# Patient Record
Sex: FEMALE | Race: BLACK OR AFRICAN AMERICAN | NOT HISPANIC OR LATINO | Employment: FULL TIME | URBAN - METROPOLITAN AREA
[De-identification: names, ages, dates, MRNs, and addresses within clinical notes are randomized per-mention and may not be internally consistent; named-entity substitution may affect disease eponyms.]

---

## 2019-11-11 ENCOUNTER — OFFICE VISIT (OUTPATIENT)
Dept: OBSTETRICS AND GYNECOLOGY | Facility: CLINIC | Age: 24
End: 2019-11-11
Payer: COMMERCIAL

## 2019-11-11 VITALS
WEIGHT: 119.06 LBS | BODY MASS INDEX: 21.09 KG/M2 | SYSTOLIC BLOOD PRESSURE: 124 MMHG | HEIGHT: 63 IN | DIASTOLIC BLOOD PRESSURE: 77 MMHG

## 2019-11-11 DIAGNOSIS — R10.2 PELVIC PAIN: Primary | ICD-10-CM

## 2019-11-11 DIAGNOSIS — Z30.431 IUD CHECK UP: ICD-10-CM

## 2019-11-11 LAB
B-HCG UR QL: NEGATIVE
BILIRUB SERPL-MCNC: NORMAL MG/DL
BLOOD URINE, POC: NORMAL
CANDIDA RRNA VAG QL PROBE: NEGATIVE
COLOR, POC UA: YELLOW
CTP QC/QA: YES
G VAGINALIS RRNA GENITAL QL PROBE: NEGATIVE
GLUCOSE UR QL STRIP: NORMAL
KETONES UR QL STRIP: NORMAL
LEUKOCYTE ESTERASE URINE, POC: NORMAL
NITRITE, POC UA: NORMAL
PH, POC UA: 5
PROTEIN, POC: NORMAL
SPECIFIC GRAVITY, POC UA: 1
T VAGINALIS RRNA GENITAL QL PROBE: NEGATIVE
UROBILINOGEN, POC UA: NORMAL

## 2019-11-11 PROCEDURE — 99203 OFFICE O/P NEW LOW 30 MIN: CPT | Mod: PBBFAC | Performed by: NURSE PRACTITIONER

## 2019-11-11 PROCEDURE — 99203 OFFICE O/P NEW LOW 30 MIN: CPT | Mod: S$GLB,,, | Performed by: NURSE PRACTITIONER

## 2019-11-11 PROCEDURE — 99999 PR PBB SHADOW E&M-NEW PATIENT-LVL III: CPT | Mod: PBBFAC,,, | Performed by: NURSE PRACTITIONER

## 2019-11-11 PROCEDURE — 87661 TRICHOMONAS VAGINALIS AMPLIF: CPT

## 2019-11-11 PROCEDURE — 99999 PR PBB SHADOW E&M-NEW PATIENT-LVL III: ICD-10-PCS | Mod: PBBFAC,,, | Performed by: NURSE PRACTITIONER

## 2019-11-11 PROCEDURE — 87491 CHLMYD TRACH DNA AMP PROBE: CPT

## 2019-11-11 PROCEDURE — 81002 URINALYSIS NONAUTO W/O SCOPE: CPT | Mod: PBBFAC | Performed by: NURSE PRACTITIONER

## 2019-11-11 PROCEDURE — 99203 PR OFFICE/OUTPT VISIT, NEW, LEVL III, 30-44 MIN: ICD-10-PCS | Mod: S$GLB,,, | Performed by: NURSE PRACTITIONER

## 2019-11-11 PROCEDURE — 81025 URINE PREGNANCY TEST: CPT | Mod: PBBFAC | Performed by: NURSE PRACTITIONER

## 2019-11-12 ENCOUNTER — TELEPHONE (OUTPATIENT)
Dept: OBSTETRICS AND GYNECOLOGY | Facility: CLINIC | Age: 24
End: 2019-11-12

## 2019-11-12 ENCOUNTER — HOSPITAL ENCOUNTER (OUTPATIENT)
Dept: RADIOLOGY | Facility: OTHER | Age: 24
Discharge: HOME | End: 2019-11-12
Attending: NURSE PRACTITIONER
Payer: COMMERCIAL

## 2019-11-12 DIAGNOSIS — Z30.431 IUD CHECK UP: ICD-10-CM

## 2019-11-12 DIAGNOSIS — R10.2 PELVIC PAIN: ICD-10-CM

## 2019-11-12 PROCEDURE — 76830 US PELVIS COMP WITH TRANSVAG NON-OB (XPD): ICD-10-PCS | Mod: 26,,, | Performed by: RADIOLOGY

## 2019-11-12 PROCEDURE — 76830 TRANSVAGINAL US NON-OB: CPT | Mod: TC

## 2019-11-12 PROCEDURE — 76830 TRANSVAGINAL US NON-OB: CPT | Mod: 26,,, | Performed by: RADIOLOGY

## 2019-11-12 PROCEDURE — 76856 US EXAM PELVIC COMPLETE: CPT | Mod: 26,,, | Performed by: RADIOLOGY

## 2019-11-12 PROCEDURE — 76856 US PELVIS COMP WITH TRANSVAG NON-OB (XPD): ICD-10-PCS | Mod: 26,,, | Performed by: RADIOLOGY

## 2019-11-12 NOTE — PROGRESS NOTES
History & Physical  Gynecology      SUBJECTIVE:     Chief Complaint: Vaginal Pain       History of Present Illness: Patient presents to the [facility] for evaluation of pelvic pain that began within the past 4 weeks and has worsened in the past week. Pain is similar to pain with initial placement of Mirena in 2017. Denies vaginal bleeding, abnormal discharge, fever/chills, or nausea/vomiting. Pain is described as stabbing, rated 8/10, and intermittent. Last sexually active 3 months ago with use of condom.         Review of patient's allergies indicates:  No Known Allergies    History reviewed. No pertinent past medical history.  History reviewed. No pertinent surgical history.  OB History        0    Para   0    Term   0       0    AB   0    Living   0       SAB   0    TAB   0    Ectopic   0    Multiple   0    Live Births   0               Family History   Problem Relation Age of Onset    Breast cancer Maternal Grandmother     Colon cancer Neg Hx     Hypertension Neg Hx     Diabetes Neg Hx     Ovarian cancer Neg Hx      Social History     Tobacco Use    Smoking status: Never Smoker    Smokeless tobacco: Never Used   Substance Use Topics    Alcohol use: Yes    Drug use: Never       No current outpatient medications on file.     No current facility-administered medications for this visit.          Review of Systems:  Review of Systems   Constitutional: Negative for activity change, appetite change, chills, fatigue, fever and unexpected weight change.   HENT: Negative for nasal congestion and mouth sores.    Eyes: Negative for discharge and visual disturbance.   Respiratory: Negative for shortness of breath and wheezing.    Cardiovascular: Negative for chest pain, palpitations and leg swelling.   Gastrointestinal: Negative for abdominal pain, constipation, diarrhea, nausea, vomiting and reflux.   Endocrine: Negative for diabetes, hair loss, hot flashes, hyperthyroidism and hypothyroidism.    Genitourinary: Negative for bladder incontinence, decreased libido, dysmenorrhea, dyspareunia, dysuria, flank pain, frequency, genital sores, hematuria, hot flashes, menorrhagia, menstrual problem, pelvic pain, urgency, vaginal bleeding, vaginal discharge, vaginal pain, urinary incontinence, postcoital bleeding, postmenopausal bleeding, vaginal dryness and vaginal odor.   Musculoskeletal: Negative for arthralgias, back pain, joint swelling, leg pain and myalgias.   Integumentary:  Negative for rash, acne, hair changes, mole/lesion, breast mass, nipple discharge, breast skin changes and breast tenderness.   Neurological: Negative for vertigo, seizures, syncope, numbness and headaches.   Hematological: Negative for adenopathy. Does not bruise/bleed easily.   Psychiatric/Behavioral: Negative for depression and sleep disturbance. The patient is not nervous/anxious.    Breast: Negative for asymmetry, breast self exam, lump, mass, mastodynia, nipple discharge, skin changes and tenderness       OBJECTIVE:     Physical Exam:  Physical Exam   Constitutional: She is oriented to person, place, and time. She appears well-developed and well-nourished. No distress.   HENT:   Head: Normocephalic and atraumatic.   Nose: Nose normal.   Mouth/Throat: Oropharynx is clear and moist.   Eyes: Pupils are equal, round, and reactive to light. Conjunctivae and EOM are normal.   Neck: Normal range of motion. Neck supple. No JVD present. No tracheal deviation present. No thyromegaly present.   Cardiovascular: Normal rate, regular rhythm and intact distal pulses. Exam reveals no gallop and no friction rub.   No murmur heard.  Pulmonary/Chest: Effort normal. No stridor. No respiratory distress. She has no wheezes. She has no rales. She exhibits no tenderness.   Abdominal: Soft. She exhibits no distension and no mass. There is no tenderness. There is no rebound and no guarding. No hernia.   Genitourinary: Rectum normal, vagina normal and uterus  normal. There is no rash, tenderness, lesion or injury on the right labia. There is no rash, tenderness, lesion or injury on the left labia. Uterus is not deviated, not enlarged, not fixed and not tender. Cervix exhibits no motion tenderness, no discharge and no friability. Right adnexum displays no mass, no tenderness and no fullness. Left adnexum displays no mass, no tenderness and no fullness. No erythema, tenderness or bleeding in the vagina. No foreign body in the vagina. No signs of injury around the vagina. No vaginal discharge found.   Genitourinary Comments: IUD strings present and extending 1 cm from cervical os.    No tenderness/guarding with bimanual exam.   Musculoskeletal: Normal range of motion. She exhibits no edema or tenderness.   Lymphadenopathy:     She has no cervical adenopathy.   Neurological: She is alert and oriented to person, place, and time. She displays normal reflexes. No sensory deficit. She exhibits normal muscle tone. Coordination normal.   Skin: Skin is warm and dry. Capillary refill takes less than 2 seconds. No rash noted. She is not diaphoretic. No erythema. No pallor.   Psychiatric: She has a normal mood and affect. Her behavior is normal. Judgment and thought content normal.   Nursing note and vitals reviewed.        ASSESSMENT:       ICD-10-CM ICD-9-CM    1. Pelvic pain R10.2 [id number] US Pelvis Comp with Transvag NON-OB (xpd      C. trachomatis/N. gonorrhoeae by AMP DNA [facility]; Cervicovaginal      Vaginosis Screen by DNA Probe      POCT urine pregnancy      POCT URINE DIPSTICK WITHOUT MICROSCOPE   2. IUD check up Z30.431 V25.42 US Pelvis Comp with Transvag NON-OB (xpd          Plan:      UPT negative.  Affirm/GCCT  Urine dip is clear    Pelvic US for evaluation of pelvic pain and to assess IUD placement. Will await result of imaging/testing for further evaluation for pelvic pain.    She reports pain is adequately controlled with use of Ibuprofen. She declines further pain  medication at this time.  Counseling time: 15 minutes      MARVIN DanielsC

## 2019-11-13 ENCOUNTER — TELEPHONE (OUTPATIENT)
Dept: OBSTETRICS AND GYNECOLOGY | Facility: CLINIC | Age: 24
End: 2019-11-13

## 2019-11-13 LAB
C TRACH DNA SPEC QL NAA+PROBE: NOT DETECTED
N GONORRHOEA DNA SPEC QL NAA+PROBE: NOT DETECTED

## 2019-11-13 NOTE — TELEPHONE ENCOUNTER
Called  the patient and notify that her gonorrhea and chlamydia testing was negative.     Thanks arabella

## 2019-11-18 ENCOUNTER — TELEPHONE (OUTPATIENT)
Dept: OBSTETRICS AND GYNECOLOGY | Facility: CLINIC | Age: 24
End: 2019-11-18

## 2019-11-18 NOTE — TELEPHONE ENCOUNTER
Spoke with pt was able to get her scheduled with a  A physician within two weeks from 11/18/2019 pt voiced understanding with no questions.

## 2019-11-29 ENCOUNTER — OFFICE VISIT (OUTPATIENT)
Dept: OBSTETRICS AND GYNECOLOGY | Facility: CLINIC | Age: 24
End: 2019-11-29
Payer: COMMERCIAL

## 2019-11-29 VITALS
DIASTOLIC BLOOD PRESSURE: 78 MMHG | BODY MASS INDEX: 21.64 KG/M2 | SYSTOLIC BLOOD PRESSURE: 102 MMHG | WEIGHT: 122.13 LBS

## 2019-11-29 DIAGNOSIS — R10.2 PELVIC PAIN: Primary | ICD-10-CM

## 2019-11-29 PROCEDURE — 99213 PR OFFICE/OUTPT VISIT, EST, LEVL III, 20-29 MIN: ICD-10-PCS | Mod: S$GLB,,, | Performed by: STUDENT IN AN ORGANIZED HEALTH CARE EDUCATION/TRAINING PROGRAM

## 2019-11-29 PROCEDURE — 99999 PR PBB SHADOW E&M-EST. PATIENT-LVL II: CPT | Mod: PBBFAC,,, | Performed by: STUDENT IN AN ORGANIZED HEALTH CARE EDUCATION/TRAINING PROGRAM

## 2019-11-29 PROCEDURE — 99999 PR PBB SHADOW E&M-EST. PATIENT-LVL II: ICD-10-PCS | Mod: PBBFAC,,, | Performed by: STUDENT IN AN ORGANIZED HEALTH CARE EDUCATION/TRAINING PROGRAM

## 2019-11-29 PROCEDURE — 99213 OFFICE O/P EST LOW 20 MIN: CPT | Mod: S$GLB,,, | Performed by: STUDENT IN AN ORGANIZED HEALTH CARE EDUCATION/TRAINING PROGRAM

## 2019-11-29 RX ORDER — SPIRONOLACTONE 100 MG/1
TABLET, FILM COATED ORAL
Refills: 5 | COMMUNITY
Start: 2019-11-04

## 2019-11-29 NOTE — PROGRESS NOTES
History & Physical  Gynecology      SUBJECTIVE:     Chief Complaint: Pelvic Pain       History of Present Illness:  Pt presents today as follow up of pelvic pain. Was seen on 19 in [facility] for this issue. GC/CT, affirm negative. IUD strings visualized. TVUS was ordered as the patient reported pain was as bad as when Mirena was just inserted . TVUS shows IUD in correct place with just simple 3cm cyst of right adnexa. No other findings  Today, pain has resolved.    TVUS 19  FINDINGS:  Uterus:  Size: 7.0 x 2.8 x 5.0 cm  Masses: None  Endometrium: Normal in this pre menopausal patient, measuring 2 mm.  IUD appears to be in appropriate position within the endometrial canal.  Right ovary:  Size: 4.7 x 2.7 x 3.3 cm  Appearance: 3.5 cm benign appearing cyst.  Vascular flow: Normal.  Left ovary:  Size: 2.5 x 0.8 x 2.3 cm  Appearance: Normal  Vascular Flow: Normal.  Free Fluid:  Trace   Impression     IUD appears to be in appropriate position within the endometrial canal.         Review of patient's allergies indicates:  No Known Allergies    History reviewed. No pertinent past medical history.  History reviewed. No pertinent surgical history.  OB History        0    Para   0    Term   0       0    AB   0    Living   0       SAB   0    TAB   0    Ectopic   0    Multiple   0    Live Births   0               Family History   Problem Relation Age of Onset    Breast cancer Maternal Grandmother     Colon cancer Neg Hx     Hypertension Neg Hx     Diabetes Neg Hx     Ovarian cancer Neg Hx      Social History     Tobacco Use    Smoking status: Never Smoker    Smokeless tobacco: Never Used   Substance Use Topics    Alcohol use: Yes    Drug use: Never       Current Outpatient Medications   Medication Sig    spironolactone (ALDACTONE) 100 MG tablet TK 1 T PO QD FOR ACNE     No current facility-administered medications for this visit.          Review of Systems:  Review of Systems   Constitutional:  Negative for activity change, appetite change, chills and fever.   Gastrointestinal: Negative for abdominal pain, blood in stool, constipation, diarrhea, nausea and vomiting.   Genitourinary: Negative for dyspareunia, dysuria, hematuria, pelvic pain, vaginal bleeding, vaginal discharge, vaginal pain and postcoital bleeding.        OBJECTIVE:     Physical Exam:  Physical Exam   Constitutional: She is oriented to person, place, and time. She appears well-developed and well-nourished. No distress.   HENT:   Head: Normocephalic and atraumatic.   Eyes: Conjunctivae are normal.   Neck: Normal range of motion.   Cardiovascular: Normal rate.   Pulmonary/Chest: Effort normal.   Musculoskeletal: Normal range of motion.   Neurological: She is alert and oriented to person, place, and time.   Skin: Skin is warm and dry. She is not diaphoretic.   Psychiatric: She has a normal mood and affect.   Vitals reviewed.        ASSESSMENT:       ICD-10-CM ICD-9-CM    1. Pelvic pain R10.2 [id number]           Plan:      Pt presents for followup of pelvic pain. All results normal and reviewed with patient  Pt is no longer having any issues  Considering Mirena removal. discussed alternative forms of birth control if she should want it removed early    Counseling time: 15 minutes    Raisa Steen

## 2019-11-29 NOTE — LETTER
November 29, 2019      Aleyda Samson, NATALIA  [address]  [address]  [facility] [address]           [facility] [id number] [place] [address]  [address] [facility] [address]  Phone: [phone]          Patient: Erin Salcido   MR Number: [id number]   YOB: 1995   Date of Visit: 11/29/2019       Dear Aleyda Samson:    Thank you for referring Erin Salcido to me for evaluation. Attached you will find relevant portions of my assessment and plan of care.    If you have questions, please do not hesitate to call me. I look forward to following Erin Salcido along with you.    Sincerely,    Raisa Steen MD    Enclosure  CC:  No Recipients    If you would like to receive this communication electronically, please contact [email or web address] or [phone] to request more information on [employer] access.    For providers and/or their staff who would like to refer a patient to [facility], please contact us through our one-stop-shop provider referral line, [facility], at [phone].    If you feel you have received this communication in error or would no longer like to receive these types of communications, please e-mail [email or web address]

## 2019-12-04 ENCOUNTER — NURSE TRIAGE (OUTPATIENT)
Dept: ADMINISTRATIVE | Facility: CLINIC | Age: 24
End: 2019-12-04

## 2019-12-04 DIAGNOSIS — R10.2 FEMALE PELVIC PAIN: Primary | ICD-10-CM

## 2019-12-04 NOTE — TELEPHONE ENCOUNTER
Pt states she was seen for an ovarian cyst a few weeks ago and today she started having cramping pain, she states it is now a 4/10 but earlier was 10/10, she denies any bleeding, she denies taking any medication for it, advised her to go to ER if the pain remains or worsens but she states she does not want to because they will tell her there is nothing they can do, asked if she would like to schedule another visit with Dr Steen or Dr Samson and she stated she does not because they told her it will just take time to heal, asked her if there is anything I can do for her and she stated there is not, advised her to use ibuprofen if she is not allergic to get the pain and inflammation under control    Reason for Disposition   Menstrual cramps is main concern   Symptoms began at age over 21    Additional Information   Negative: Shock suspected (e.g., cold/pale/clammy skin, too weak to stand, low BP, rapid pulse)   Negative: Passed out (i.e., lost consciousness, collapsed and was not responding)   Negative: Sounds like a life-threatening emergency to the triager   Negative: Sounds like a life-threatening emergency to the triager   Negative: Abdominal cramps unrelated to menstrual period   Negative: [1] SEVERE pain AND [2] pain clearly increases with coughing   Negative: Patient sounds very sick or weak to the triager   Negative: [1] SEVERE vaginal bleeding (i.e., soaking 2 pads or tampons per hour and present 2 or more hours) AND [2] worse than ever before   Negative: [1] MODERATE vaginal bleeding (i.e., soaking 1 pad or tampon per hour and present > 6 hours) AND [2] worse than ever before   Negative: Fever > 100.5 F (38.1 C)   Negative: Could be pregnant (e.g., missed last menstrual period)   Negative: [1] SEVERE pain (e.g., excruciating cramps) AND [2] worse than ever before AND [3] not improved with ibuprofen or naproxen   Negative: [1] Pain present > 3 days AND [2] normally menstrual cramps last 1-3  days   Negative: Pain only on one side   Negative: Unusual vaginal discharge (e.g., odorous, yellow, green, or foamy-white)   Negative: [1] MODERATE pain (e.g., cramps interfere with normal activities) AND [2] not relieved by ibuprofen or naproxen used per Care Advice   Negative: [1] Pain present > 3 days AND [2] occurs with every menstrual period   Negative: Pain during sexual intercourse    Protocols used: PELVIC PAIN - FEMALE-A-AH, ABDOMINAL PAIN - MENSTRUAL CRAMPS-A-AH

## 2019-12-05 NOTE — TELEPHONE ENCOUNTER
Called back patient to check in after call into on-call nurse last night. States she took ibuprofen and went to sleep last night and is fine today. Torsion/ER precautions discussed. Follow up US ordered for 6-8 weeks

## 2019-12-20 ENCOUNTER — PATIENT MESSAGE (OUTPATIENT)
Dept: OBSTETRICS AND GYNECOLOGY | Facility: CLINIC | Age: 24
End: 2019-12-20

## 2020-01-07 ENCOUNTER — HOSPITAL ENCOUNTER (OUTPATIENT)
Dept: RADIOLOGY | Facility: OTHER | Age: 25
Discharge: HOME | End: 2020-01-07
Attending: STUDENT IN AN ORGANIZED HEALTH CARE EDUCATION/TRAINING PROGRAM
Payer: COMMERCIAL

## 2020-01-07 DIAGNOSIS — R10.2 FEMALE PELVIC PAIN: ICD-10-CM

## 2020-01-07 PROCEDURE — 76830 US PELVIS COMP WITH TRANSVAG NON-OB (XPD): ICD-10-PCS | Mod: 26,,, | Performed by: RADIOLOGY

## 2020-01-07 PROCEDURE — 76830 TRANSVAGINAL US NON-OB: CPT | Mod: 26,,, | Performed by: RADIOLOGY

## 2020-01-07 PROCEDURE — 76830 TRANSVAGINAL US NON-OB: CPT | Mod: TC

## 2020-01-07 PROCEDURE — 76856 US PELVIS COMP WITH TRANSVAG NON-OB (XPD): ICD-10-PCS | Mod: 26,,, | Performed by: RADIOLOGY

## 2020-01-07 PROCEDURE — 76856 US EXAM PELVIC COMPLETE: CPT | Mod: 26,,, | Performed by: RADIOLOGY

## 2020-05-20 ENCOUNTER — PROCEDURE VISIT (OUTPATIENT)
Dept: OBSTETRICS AND GYNECOLOGY | Facility: CLINIC | Age: 25
End: 2020-05-20
Payer: COMMERCIAL

## 2020-05-20 VITALS
BODY MASS INDEX: 21.05 KG/M2 | DIASTOLIC BLOOD PRESSURE: 68 MMHG | SYSTOLIC BLOOD PRESSURE: 100 MMHG | HEIGHT: 63 IN | WEIGHT: 118.81 LBS

## 2020-05-20 DIAGNOSIS — Z30.432 ENCOUNTER FOR IUD REMOVAL: Primary | ICD-10-CM

## 2020-05-20 DIAGNOSIS — Z30.09 CONTRACEPTIVE EDUCATION: ICD-10-CM

## 2020-05-20 PROCEDURE — 99213 PR OFFICE/OUTPT VISIT, EST, LEVL III, 20-29 MIN: ICD-10-PCS | Mod: 25,S$GLB,, | Performed by: STUDENT IN AN ORGANIZED HEALTH CARE EDUCATION/TRAINING PROGRAM

## 2020-05-20 PROCEDURE — 58301 REMOVE INTRAUTERINE DEVICE: CPT | Mod: S$GLB,,, | Performed by: STUDENT IN AN ORGANIZED HEALTH CARE EDUCATION/TRAINING PROGRAM

## 2020-05-20 PROCEDURE — 99213 OFFICE O/P EST LOW 20 MIN: CPT | Mod: 25,S$GLB,, | Performed by: STUDENT IN AN ORGANIZED HEALTH CARE EDUCATION/TRAINING PROGRAM

## 2020-05-20 PROCEDURE — 58301 PR REMOVE, INTRAUTERINE DEVICE: ICD-10-PCS | Mod: S$GLB,,, | Performed by: STUDENT IN AN ORGANIZED HEALTH CARE EDUCATION/TRAINING PROGRAM

## 2020-05-20 NOTE — PROGRESS NOTES
Pt presents today for IUD removal. She had mirena placed two years ago. Had amenorrhea and no pain for one year or so, then started having issues about six months ago. She reports irregular cycles, painful cycles. Also issues with acne.   She has been on pills in the past and had difficult time remembering to take it  She has tried the ring and did not like inserting it.  She is interested in non-hormonal options.  She would like to take some time off of nothing for a bit before starting anything new.     NAD  Normal respiratory rate  Normal pulse  Abdomen is soft, nontender  External genitalia and urethra normal  Vagina normal.  IUD strings noted at cervical os      IUD removed intact.  PMH, medications reviewed. Patient was counseled today on contraceptivel options--Pills, Patch, Depo-Provera, IUD, Nuva Ring, Nexplanon and Mirena/Sklya/Paragard and the pros and cons of each and advatantages of condoms to prevent STDs.  The patient is considering paragard. All questions were answered.  Consistent condom use also encouraged due to aldactone teratogenicity     RTC as needed or for annual exam    Raisa Steen M.D.

## 2020-05-20 NOTE — PATIENT INSTRUCTIONS
Consistent condom use recommended if not on other forms of contraception -especially in light of spironolactone use.  Paragard is the name of the copper IUD  Please message back when you have decided which form you would like to try.

## 2020-09-22 ENCOUNTER — OFFICE VISIT (OUTPATIENT)
Dept: OBSTETRICS AND GYNECOLOGY | Facility: CLINIC | Age: 25
End: 2020-09-22
Payer: OTHER GOVERNMENT

## 2020-09-22 ENCOUNTER — LAB VISIT (OUTPATIENT)
Dept: LAB | Facility: OTHER | Age: 25
End: 2020-09-22
Attending: PHYSICIAN ASSISTANT
Payer: OTHER GOVERNMENT

## 2020-09-22 VITALS
BODY MASS INDEX: 21.91 KG/M2 | WEIGHT: 123.69 LBS | SYSTOLIC BLOOD PRESSURE: 108 MMHG | DIASTOLIC BLOOD PRESSURE: 60 MMHG

## 2020-09-22 DIAGNOSIS — Z12.4 ENCOUNTER FOR PAPANICOLAOU SMEAR FOR CERVICAL CANCER SCREENING: ICD-10-CM

## 2020-09-22 DIAGNOSIS — Z11.3 SCREEN FOR STD (SEXUALLY TRANSMITTED DISEASE): ICD-10-CM

## 2020-09-22 DIAGNOSIS — Z11.3 SCREEN FOR STD (SEXUALLY TRANSMITTED DISEASE): Primary | ICD-10-CM

## 2020-09-22 DIAGNOSIS — B35.6 TINEA CRURIS: ICD-10-CM

## 2020-09-22 PROCEDURE — 99214 PR OFFICE/OUTPT VISIT, EST, LEVL IV, 30-39 MIN: ICD-10-PCS | Mod: S$GLB,,, | Performed by: PHYSICIAN ASSISTANT

## 2020-09-22 PROCEDURE — 86696 HERPES SIMPLEX TYPE 2 TEST: CPT

## 2020-09-22 PROCEDURE — 87510 GARDNER VAG DNA DIR PROBE: CPT

## 2020-09-22 PROCEDURE — 99999 PR PBB SHADOW E&M-EST. PATIENT-LVL III: ICD-10-PCS | Mod: PBBFAC,,, | Performed by: PHYSICIAN ASSISTANT

## 2020-09-22 PROCEDURE — 87491 CHLMYD TRACH DNA AMP PROBE: CPT

## 2020-09-22 PROCEDURE — 86703 HIV-1/HIV-2 1 RESULT ANTBDY: CPT

## 2020-09-22 PROCEDURE — 87480 CANDIDA DNA DIR PROBE: CPT

## 2020-09-22 PROCEDURE — 86803 HEPATITIS C AB TEST: CPT

## 2020-09-22 PROCEDURE — 88175 CYTOPATH C/V AUTO FLUID REDO: CPT

## 2020-09-22 PROCEDURE — 87340 HEPATITIS B SURFACE AG IA: CPT

## 2020-09-22 PROCEDURE — 36415 COLL VENOUS BLD VENIPUNCTURE: CPT

## 2020-09-22 PROCEDURE — 87529 HSV DNA AMP PROBE: CPT

## 2020-09-22 PROCEDURE — 86592 SYPHILIS TEST NON-TREP QUAL: CPT

## 2020-09-22 PROCEDURE — 99214 OFFICE O/P EST MOD 30 MIN: CPT | Mod: S$GLB,,, | Performed by: PHYSICIAN ASSISTANT

## 2020-09-22 PROCEDURE — 99999 PR PBB SHADOW E&M-EST. PATIENT-LVL III: CPT | Mod: PBBFAC,,, | Performed by: PHYSICIAN ASSISTANT

## 2020-09-22 PROCEDURE — 86694 HERPES SIMPLEX NES ANTBDY: CPT

## 2020-09-22 RX ORDER — CLOTRIMAZOLE 1 %
CREAM (GRAM) TOPICAL
Qty: 30 G | Refills: 1 | Status: SHIPPED | OUTPATIENT
Start: 2020-09-22 | End: 2021-01-27

## 2020-09-22 NOTE — PATIENT INSTRUCTIONS
Jock Itch (Tinea Cruris, General)  Jock itch (tinea cruris) is a red, itchy rash in the groin caused by a fungal infection. It occurs in skin folds where it is warm and moist. It commonly starts as a small, red, itchy patch that grows larger. The patch is usually in the shape of a round ring, 1 to 2 inches wide. It may cause the skin to flake. It may also spread to the scrotum or the skin that covers your testicles. This infection is treated with skin creams or oral medicine.  Home care  · If you were prescribed a cream, use it exactly as directed. You can buy some antifungal creams without a prescription.  · It may take a week before the fungus starts to go away. It can take about 2 to 3 weeks to completely clear. To stop the rash from coming back, keep using the medicine until the rash is all gone.  · Wash the area at least once a day with soap and water. Pat dry and apply medicine.   · Wear loose-fitting underwear to let your skin breathe. Change your underwear daily.  · Once the rash is gone, keep the area clean and dry to prevent reinfection. If recurrence is a problem, use a medicated antifungal powder daily. This is available over the counter.  Prevention  The following tips may help prevent jock itch:  · Don't share clothes, towels, or sports gear with others unless they have been washed.  · Change your underwear daily.  · Keep skin clean and dry, especially after showering or swimming.  · Lose weight.  · Do not wear tight underwear.  · Treat athlete's foot if it occurs.  Follow-up care  Follow up with your healthcare provider, or as advised. Call your provider if the rash is not starting to improve after 10 days of treatment, or if the rash continues to spread.  When to seek medical advice  Call your healthcare provider right away if any of these occur:  · Increasing pain in the rash area  · Redness that spreads around the rash  · Fluid draining from the rash  · Rash returns soon after treatment  · Fever  of 100.4°F (38°C) or higher, or as directed by your provider  Date Last Reviewed: 8/1/2016 © 2000-2017 [employer]. [address] [place] [address], [place] [address]. All rights reserved. This information is not intended as a substitute for professional medical care. Always follow your healthcare professional's instructions.        Hemorrhoids    Hemorrhoids are swollen and inflamed veins inside the rectum and near the anus. The rectum is the last several inches of the colon. The anus is the passage between the rectum and the outside of the body.  Causes  The veins can become swollen due to increased pressure in them. This is most often caused by:  · Chronic constipation or diarrhea  · Straining when having a bowel movement  · Sitting too long on the toilet  · A low-fiber diet  · Pregnancy  Symptoms  · Bleeding from the rectum (this may be noticeable after bowel movements)  · Lump near the anus  · Itching around the anus  · Pain around the anus  There are different types of hemorrhoids. Depending on the type you have and the severity, you may be able to treat yourself at home. In some cases, a procedure may be the best treatment option. Your healthcare provider can tell you more about this, if needed.  Home care  General care  · To get relief from pain or itching, try:  ¨ Topical products. Your healthcare provider may prescribe or recommend creams, ointments, or pads that can be applied to the hemorrhoid. Use these exactly as directed.  ¨ Medicines. Your healthcare provider may recommend stool softeners, suppositories, or laxatives to help manage constipation. Use these exactly as directed.  ¨ Sitz baths. A sitz bath involves sitting in a few inches of warm bath water. Be careful not to make the water so hot that you burn yourself--test it before sitting in it. Soak for about 10 to 15 minutes a few times a day. This may help relieve pain.  Tips to help prevent hemorrhoids  · Eat more fiber. Fiber adds bulk to  stool and absorbs water as it moves through your colon. This makes stool softer and easier to pass.  ¨ Increase the fiber in your diet with more fiber-rich foods. These include fresh fruit, vegetables, and whole grains.  ¨ Take a fiber supplement or bulking agent, if advised to by your provider. These include products such as psyllium or methylcellulose.  · Drink plenty of water, if directed to by your provider. This can help keep stool soft.  · Be more active. Frequent exercise aids digestion and helps prevent constipation. It may also help make bowel movements more regular.  · Dont strain during bowel movements. This can make hemorrhoids more likely. Also, dont sit on the toilet for long periods of time.  Follow-up care  Follow up with your healthcare provider, or as advised. If a culture or imaging tests were done, you will be notified of the results when they are ready. This may take a few days or longer.  When to seek medical advice  Call your healthcare provider right away if any of these occur:  · Increased bleeding from the rectum  · Increased pain around the rectum or anus  · Weakness or dizziness  Call 911  Call 911 or return to the emergency department right away if any of these occur:  · Trouble breathing or swallowing  · Fainting or loss of consciousness  · Unusually fast heart rate  · Vomiting blood  · Large amounts of blood in stool  Date Last Reviewed: 6/22/2015  © 3765-8448 [employer] [address] [place] [address], [place] [address]. All rights reserved. This information is not intended as a substitute for professional medical care. Always follow your healthcare professional's instructions.

## 2020-09-22 NOTE — PROGRESS NOTES
CC: Screening for sexually transmitted infection    Erin Salcido is a 24 y.o. female  presents for STD screening  No LMP recorded. Patient has had an implant..  Reports rash for 1 month; denies itching or pain. States it is on her buttocks and noticed one on her arm recently. Denies pelvic or abdominal pain.  Denies vulvovaginal itching or irritation.  Denies abnormal or foul vaginal discharge. States her partner has a history of HSV1 10 years ago, no current relapse.     Last pap: none on file, submitted today.       ROS:  GENERAL: Denies weight gain or weight loss. Feeling well overall.   SKIN: Denies rash or lesions.   HEAD: Denies head injury or headache.   NODES: Denies enlarged lymph nodes.   CHEST: Denies chest pain or shortness of breath.   CARDIOVASCULAR: Denies palpitations or left sided chest pain.   ABDOMEN: No abdominal pain, constipation, diarrhea, nausea, vomiting or rectal bleeding.   URINARY: No frequency, dysuria, hematuria, or burning on urination.  REPRODUCTIVE: See HPI.   BREASTS: The patient performs breast self-examination and denies pain, lumps, or nipple discharge.     PHYSICAL EXAM:    APPEARANCE: Well nourished, well developed, in no acute distress.  AFFECT: Alert and oriented x 3  SKIN: Warm, dry, & intact. No acne or hirsutism.  NECK: Neck symmetric  NODES: No inguinal or femoral lymph node enlargement  CHEST:  Easy, even breaths.  ABDOMEN: Soft.  Nontender, nondistended.  PELVIC: Normal external genitalia without lesions.  Normal hair distribution.  Adequate perineal body, normal urethral meatus.    Vagina moist and well rugated without lesions or discharge.  Cervix pink, without lesions, discharge or tenderness.  No significant cystocele or rectocele.    Bimanual exam shows uterus to be normal size, regular, mobile and nontender.  Adnexa without masses or tenderness.    EXTREMITIES: No edema.    Screen for STD (sexually transmitted disease)  -     Hepatitis C Antibody; Future;  Expected date: 09/22/2020  -     C. trachomatis/N. gonorrhoeae by AMP DNA  -     HIV-1 and HIV-2 antibodies; Future; Expected date: 09/22/2020  -     RPR; Future; Expected date: 09/22/2020  -     Hepatitis B surface antigen; Future; Expected date: 09/22/2020  -     Vaginosis Screen by DNA Probe  -     Herpes simplex type 1 & 2 IgM,Herpes IgM; Future; Expected date: 09/22/2020  -     Herpes simplex type 1&2 IgG,Herpes titer; Future; Expected date: 09/22/2020  -     Herpes simplex virus culture    Encounter for Papanicolaou smear for cervical cancer screening  -     Liquid-Based Pap Smear, Screening    Tinea cruris  -     clotrimazole (LOTRIMIN) 1 % cream; Apply to affected area 2 times daily  Dispense: 30 g; Refill: 1        Patient was counseled today on prevention of STDs with use of condoms.  We also reviewed A.C.S. Pap guidelines and recommendations for yearly pelvic exams, mammograms and monthly self breast exams; to see her PCP for other health maintenance.     Followup pending lab results

## 2020-09-23 LAB
HBV SURFACE AG SERPL QL IA: NEGATIVE
HCV AB SERPL QL IA: NEGATIVE
HIV 1+2 AB+HIV1 P24 AG SERPL QL IA: NEGATIVE
RPR SER QL: NORMAL

## 2020-09-24 LAB
CANDIDA RRNA VAG QL PROBE: NEGATIVE
G VAGINALIS RRNA GENITAL QL PROBE: NEGATIVE
T VAGINALIS RRNA GENITAL QL PROBE: NEGATIVE

## 2020-09-25 LAB
HSV1 DNA SPEC QL NAA+PROBE: NEGATIVE
HSV2 DNA SPEC QL NAA+PROBE: NEGATIVE
SPECIMEN SOURCE: NORMAL

## 2020-09-26 LAB
HSV1 IGG SERPL QL IA: NEGATIVE
HSV2 IGG SERPL QL IA: NEGATIVE

## 2020-09-28 LAB — HSV AB, IGM BY EIA: 1.2 INDEX

## 2020-09-29 ENCOUNTER — TELEPHONE (OUTPATIENT)
Dept: OBSTETRICS AND GYNECOLOGY | Facility: CLINIC | Age: 25
End: 2020-09-29

## 2020-09-29 DIAGNOSIS — Z11.3 SCREEN FOR STD (SEXUALLY TRANSMITTED DISEASE): Primary | ICD-10-CM

## 2020-10-12 LAB
FINAL PATHOLOGIC DIAGNOSIS: NORMAL
Lab: NORMAL

## 2020-10-22 ENCOUNTER — OFFICE VISIT (OUTPATIENT)
Dept: OBSTETRICS AND GYNECOLOGY | Facility: CLINIC | Age: 25
End: 2020-10-22
Payer: COMMERCIAL

## 2020-10-22 VITALS
WEIGHT: 121.94 LBS | BODY MASS INDEX: 21.61 KG/M2 | SYSTOLIC BLOOD PRESSURE: 113 MMHG | HEIGHT: 63 IN | DIASTOLIC BLOOD PRESSURE: 67 MMHG

## 2020-10-22 DIAGNOSIS — B35.4 RINGWORM OF BODY: ICD-10-CM

## 2020-10-22 DIAGNOSIS — Z30.011 ENCOUNTER FOR INITIAL PRESCRIPTION OF CONTRACEPTIVE PILLS: Primary | ICD-10-CM

## 2020-10-22 LAB
B-HCG UR QL: NEGATIVE
CTP QC/QA: YES

## 2020-10-22 PROCEDURE — 99213 OFFICE O/P EST LOW 20 MIN: CPT | Mod: 25,S$GLB,, | Performed by: OBSTETRICS & GYNECOLOGY

## 2020-10-22 PROCEDURE — 81025 PR  URINE PREGNANCY TEST: ICD-10-PCS | Mod: S$GLB,,, | Performed by: OBSTETRICS & GYNECOLOGY

## 2020-10-22 PROCEDURE — 99999 PR PBB SHADOW E&M-EST. PATIENT-LVL III: CPT | Mod: PBBFAC,,, | Performed by: OBSTETRICS & GYNECOLOGY

## 2020-10-22 PROCEDURE — 81025 URINE PREGNANCY TEST: CPT | Mod: S$GLB,,, | Performed by: OBSTETRICS & GYNECOLOGY

## 2020-10-22 PROCEDURE — 99213 PR OFFICE/OUTPT VISIT, EST, LEVL III, 20-29 MIN: ICD-10-PCS | Mod: 25,S$GLB,, | Performed by: OBSTETRICS & GYNECOLOGY

## 2020-10-22 PROCEDURE — 99999 PR PBB SHADOW E&M-EST. PATIENT-LVL III: ICD-10-PCS | Mod: PBBFAC,,, | Performed by: OBSTETRICS & GYNECOLOGY

## 2020-10-22 RX ORDER — LULICONAZOLE 10 MG/G
1 CREAM TOPICAL DAILY
Qty: 60 G | Refills: 0 | Status: SHIPPED | OUTPATIENT
Start: 2020-10-22 | End: 2020-12-03

## 2020-10-22 RX ORDER — NORETHINDRONE ACETATE AND ETHINYL ESTRADIOL .02; 1 MG/1; MG/1
1 TABLET ORAL DAILY
Qty: 28 TABLET | Refills: 12 | Status: SHIPPED | OUTPATIENT
Start: 2020-10-22 | End: 2021-04-06 | Stop reason: SINTOL

## 2020-10-22 NOTE — PATIENT INSTRUCTIONS
Birth Control: The Pill    Birth control pills contain hormones that help prevent pregnancy. The pills are prescribed by your healthcare provider. There are many types of birth control pills available. If you have side effects from one type of pill, tell your healthcare provider. He or she may be able to prescribe a pill that works better for you.  Pregnancy rates  Talk to your healthcare provider about the effectiveness of this birth control method.  Using the pill  · Take one pill daily. Take it at around the same time each day.  · Follow your healthcare providers guidelines on when to start your first pack of pills. You may need to use another form of birth control for a week or more after you start.  · Know what to do if you forget to take a pill. (Consult your healthcare provider or check the package.) If you miss more than one pill, you may need to use a backup method of birth control for a week or more.  Pros  · Low pregnancy rate  · No interruption to sex  · Easy to use  · Can help make periods more regular  · May lower your risk of ovarian cysts and certain cancers  · May decrease menstrual cramps, menstrual flow, and acne  Cons  · Does not protect against sexually transmitted infection (STIs)  · Requires taking a pill on time each day  · May not work as well when taken with certain other medicines (check with your pharmacist)  · May cause side effects such as nausea, irregular bleeding, headaches, breast tenderness, fatigue, or mood changes (these often go away within 3 months)  · May increase the risk of blood clots, heart attack, and stroke  The pill may not be for you  The pill may not be for you if:  · You are a smoker and over age 35  · You have high blood pressure or gallbladder, liver, cerebrovascular  or heart disease  · You have diabetes, migraines, blood clot in the vein or artery, lupus, depression, certain lipid disorders, or take medicines that interfere with the pill  In these cases,  discuss the risks with your healthcare provider.  Date Last Reviewed: 3/1/2017  © 2535-5377 [employer]. [address] [place] [address], [place] [address]. All rights reserved. This information is not intended as a substitute for professional medical care. Always follow your healthcare professional's instructions.

## 2020-10-22 NOTE — PROGRESS NOTES
History & Physical  Gynecology      SUBJECTIVE:     Chief Complaint: Contraception       History of Present Illness:  Contraception Counseling  Ms. Salcido is a 25 y/o female who presents for contraception counseling. The patient has no complaints today. She reports that she had a mirena but it cause facial acne and other issues. Patient reports that she was on OCPs as a teenager which initially caused nausea and abdominal pain. She reports that after switching her OCPs she did fine. The patient is sexually active. Pertinent past medical history: none.        Review of patient's allergies indicates:  No Known Allergies    History reviewed. No pertinent past medical history.  History reviewed. No pertinent surgical history.  OB History        0    Para   0    Term   0       0    AB   0    Living   0       SAB   0    TAB   0    Ectopic   0    Multiple   0    Live Births   0               Family History   Problem Relation Age of Onset    Breast cancer Maternal Grandmother     Colon cancer Neg Hx     Hypertension Neg Hx     Diabetes Neg Hx     Ovarian cancer Neg Hx      Social History     Tobacco Use    Smoking status: Never Smoker    Smokeless tobacco: Never Used   Substance Use Topics    Alcohol use: Yes    Drug use: Never       Current Outpatient Medications   Medication Sig    clotrimazole (LOTRIMIN) 1 % cream Apply to affected area 2 times daily    luliconazole 1 % Crea Apply 1 g topically once daily.    norethindrone-ethinyl estradiol (LOESTRIN 1/20, 21,) 1-20 mg-mcg per tablet Take 1 tablet by mouth once daily.    spironolactone (ALDACTONE) 100 MG tablet TK 1 T PO QD FOR ACNE     No current facility-administered medications for this visit.          Review of Systems:  Review of Systems   Constitutional: Negative for chills and fever.   Respiratory: Negative for cough and wheezing.    Cardiovascular: Negative for chest pain and palpitations.   Gastrointestinal: Negative for abdominal pain,  nausea and vomiting.   Genitourinary: Negative for dysuria, frequency, hematuria, pelvic pain, vaginal bleeding, vaginal discharge and vaginal pain.   Psychiatric/Behavioral: Negative for depression.        OBJECTIVE:     Physical Exam:  Physical Exam  Vitals signs and nursing note reviewed.   Constitutional:       Appearance: She is well-developed.   Cardiovascular:      Rate and Rhythm: Normal rate.   Pulmonary:      Effort: Pulmonary effort is normal. No respiratory distress.   Chest:      Breasts: Breasts are symmetrical.     Abdominal:      General: There is no distension.      Palpations: Abdomen is soft.      Tenderness: There is no abdominal tenderness.   Skin:     General: Skin is warm and dry.   Neurological:      Mental Status: She is alert and oriented to person, place, and time.         ASSESSMENT:       ICD-10-CM ICD-9-CM    1. Encounter for initial prescription of contraceptive pills  Z30.011 V25.01 norethindrone-ethinyl estradiol (LOESTRIN 1/20, 21,) 1-20 mg-mcg per tablet      POCT urine pregnancy   2. Ringworm of body  B35.4 110.5 luliconazole 1 % Crea        Plan:      Erin was seen today for contraception.    Diagnoses and all orders for this visit:    Encounter for initial prescription of contraceptive pills  -     norethindrone-ethinyl estradiol (LOESTRIN 1/20, 21,) 1-20 mg-mcg per tablet; Take 1 tablet by mouth once daily.  -     POCT urine pregnancy    Ringworm of body  -     luliconazole 1 % Crea; Apply 1 g topically once daily.        Orders Placed This Encounter   Procedures    POCT urine pregnancy       Follow up in about 1 year (around 10/22/2021) for Well Woman/Annual.    Counseling time: 15 minutes    Juan C Valderrama

## 2020-11-10 ENCOUNTER — PATIENT MESSAGE (OUTPATIENT)
Dept: OBSTETRICS AND GYNECOLOGY | Facility: CLINIC | Age: 25
End: 2020-11-10

## 2020-11-25 ENCOUNTER — OFFICE VISIT (OUTPATIENT)
Dept: FAMILY MEDICINE | Facility: CLINIC | Age: 25
End: 2020-11-25
Payer: OTHER GOVERNMENT

## 2020-11-25 DIAGNOSIS — F32.A ANXIETY AND DEPRESSION: Primary | ICD-10-CM

## 2020-11-25 DIAGNOSIS — L60.0 INGROWN TOENAIL: ICD-10-CM

## 2020-11-25 DIAGNOSIS — Z01.83 BLOOD TYPING ENCOUNTER: ICD-10-CM

## 2020-11-25 DIAGNOSIS — R41.840 CONCENTRATION DEFICIT: ICD-10-CM

## 2020-11-25 DIAGNOSIS — Z13.220 SCREENING FOR HYPERLIPIDEMIA: ICD-10-CM

## 2020-11-25 DIAGNOSIS — F41.9 ANXIETY AND DEPRESSION: Primary | ICD-10-CM

## 2020-11-25 DIAGNOSIS — Z20.822 EXPOSURE TO COVID-19 VIRUS: ICD-10-CM

## 2020-11-25 PROCEDURE — 99203 OFFICE O/P NEW LOW 30 MIN: CPT | Mod: 95,,, | Performed by: FAMILY MEDICINE

## 2020-11-25 PROCEDURE — 99203 PR OFFICE/OUTPT VISIT, NEW, LEVL III, 30-44 MIN: ICD-10-PCS | Mod: 95,,, | Performed by: FAMILY MEDICINE

## 2020-11-25 NOTE — PROGRESS NOTES
Chief Complaint   Patient presents with    Establish Care       HPI  Erin Salcido is a 25 y.o. female with multiple medical diagnoses as listed in the medical history and problem list that presents for establishment of care . 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 Has had these sx before. Has family members with similar sx but no formal diagnosis. She has also had worsening issues with concentration that have been present throughout childhood but are progressing now and she would like to be evaluated for ADHD    PAST MEDICAL HISTORY:  Past Medical History:   Diagnosis Date    Depression        PAST SURGICAL HISTORY:  History reviewed. No pertinent surgical history.    SOCIAL HISTORY:  Social History     Socioeconomic History    Marital status: Single     Spouse name: Not on file    Number of children: Not on file    Years of education: Not on file    Highest education level: Not on file   Occupational History    Not on file   Social Needs    Financial resource strain: Not on file    Food insecurity     Worry: Not on file     Inability: Not on file    Transportation needs     Medical: Not on file     Non-medical: Not on file   Tobacco Use    Smoking status: Never Smoker    Smokeless tobacco: Never Used   Substance and Sexual Activity    Alcohol use: Yes    Drug use: Never    Sexual activity: Yes     Partners: Male     Birth control/protection: I.U.D., Condom   Lifestyle    Physical activity     Days per week: Not on file     Minutes per session: Not on file    Stress: Not on file   Relationships    Social connections     Talks on phone: Not on file     Gets together: Not on file     Attends Islam service: Not on file     Active member of club or organization: Not on file     Attends meetings of clubs or organizations: Not on file     Relationship status: Not on file    Other Topics Concern    Not on file   Social History Narrative    Not on file       FAMILY HISTORY:  Family History   Problem Relation Age of Onset    Breast cancer Maternal Grandmother     Colon cancer Neg Hx     Hypertension Neg Hx     Diabetes Neg Hx     Ovarian cancer Neg Hx        ALLERGIES AND MEDICATIONS: updated and reviewed.  Review of patient's allergies indicates:  No Known Allergies  Medication List with Changes/Refills   Current Medications    CLOTRIMAZOLE (LOTRIMIN) 1 % CREAM    Apply to affected area 2 times daily    LULICONAZOLE 1 % CREA    Apply 1 g topically once daily.    NORETHINDRONE-ETHINYL ESTRADIOL (LOESTRIN 1/20, 21,) 1-20 MG-MCG PER TABLET    Take 1 tablet by mouth once daily.    SPIRONOLACTONE (ALDACTONE) 100 MG TABLET    TK 1 T PO QD FOR ACNE       ROS  Review of Systems   Constitutional: Negative for activity change and unexpected weight change.   HENT: Negative for hearing loss, rhinorrhea and trouble swallowing.    Eyes: Negative for discharge and visual disturbance.   Respiratory: Negative for chest tightness and wheezing.    Cardiovascular: Negative for chest pain and palpitations.   Gastrointestinal: Negative for blood in stool, constipation, diarrhea and vomiting.   Endocrine: Negative for polydipsia and polyuria.   Genitourinary: Negative for difficulty urinating, dysuria, hematuria and menstrual problem.   Musculoskeletal: Negative for arthralgias, joint swelling and neck pain.   Neurological: Negative for weakness and headaches.   Psychiatric/Behavioral: Positive for dysphoric mood. Negative for confusion.       Physical Exam  There were no vitals filed for this visit. There is no height or weight on file to calculate BMI.            Physical Exam  Vitals signs and nursing note reviewed.   Constitutional:       Appearance: She is well-developed.   Skin:     General: Skin is warm and dry.      Findings: No erythema or rash.   Neurological:      Mental Status: She is  alert and oriented to person, place, and time.   Psychiatric:         Behavior: Behavior normal.         Health Maintenance       Date Due Completion Date    Lipid Panel 1996 ---    HPV Vaccines (1 - 2-dose series) 10/24/2007 ---    TETANUS VACCINE 10/24/2014 ---    Influenza Vaccine (1) 08/01/2020 ---    Chlamydia Screening 11/11/2020 11/11/2019    Pap Smear 09/22/2023 9/22/2020          Health maintenance reviewed and addressed as ordered      ASSESSMENT     1. Anxiety and depression    2. Ingrown toenail    3. Concentration deficit    4. Blood typing encounter    5. Screening for hyperlipidemia    6. Exposure to COVID-19 virus        PLAN:     Problem List Items Addressed This Visit     None      Visit Diagnoses     Anxiety and depression    -  Primary  -she has a hx of concentration problems in childhood that are worsening with recent depression  Will refer to psych for formal eval; currently undergoing therapy    Relevant Orders    Ambulatory referral/consult to Psychiatry    Comprehensive Metabolic Panel    CBC Auto Differential    Ingrown toenail      -refer for removal     Relevant Orders    Ambulatory referral/consult to Podiatry    Concentration deficit      -refer to psych for formal ADHD eval    Blood typing encounter        Relevant Orders    GROUP & RH    Screening for hyperlipidemia        Relevant Orders    Lipid Panel    Exposure to COVID-19 virus        Relevant Orders    COVID-19 (SARS CoV-2) IgG Antibody            Denise Perry MD  11/25/2020 2:06 PM        Follow up if symptoms worsen or fail to improve.    Orders Placed This Encounter   Procedures    Lipid Panel    Comprehensive Metabolic Panel    CBC Auto Differential    COVID-19 (SARS CoV-2) IgG Antibody    Ambulatory referral/consult to Psychiatry    Ambulatory referral/consult to Podiatry    GROUP & RH              The patient location is:  Patient Home   The chief complaint leading to consultation is: depression and ingrown  toenail  Visit type: Virtual visit with synchronous audio and video  Total time spent with patient: 15 min  Each patient to whom he or she provides medical services by telemedicine is:  (1) informed of the relationship between the physician and patient and the respective role of any other health care provider with respect to management of the patient; and (2) notified that he or she may decline to receive medical services by telemedicine and may withdraw from such care at any time.

## 2020-11-25 NOTE — Clinical Note
Please schedule labs at [facility] along with Herpes Titer ordered by Kay Rios for next week    Denise Perry MD

## 2020-11-30 NOTE — PROGRESS NOTES
"Outpatient Psychiatry Initial Visit (MD/NP)    12/3/2020    Erin Salcido, a 25 y.o. female, presenting for initial evaluation visit. Met with patient.    Reason for Encounter: Referral from PAUL Perry MD. Patient complains of   Chief Complaint   Patient presents with    Distractibilty    Inattention   .    History of Present Illness:   Pt complains of symptoms relating to inattention. Sxs began in childhood.    Trouble paying close attention to details, or careless mistakes: admits to  Difficulty sustaining attention/remaining focused: admits to  Absent minded/wandeing thoughts during conversation: admits to  Doesn't follow through on instructions, starts tasks but does not finish or easily distracted: admits to  Difficulty with organizing: admits to  Avoids/dislikes tasks that require sustained attention: admits to  Looses important things: admits to  Easily distracted by extraneous stimuli: admits to  Forgetful in daily activities: admits to   ------  Often fidgets/squirms: admits to  Often leaves seat at inappropriate times: admits to  Runs around, climbing on things or feeling restless: denies  Unable to engage in leisure activities: denies  Often "on the go" , motor driven: admits to  Often talks excessively: denies  Blurts out, interrupts: admits to  Can't wait turn: admits to  Often interrupts/intrudes: admits to    Pt denies elevated, expansive, or irritable mood with increased energy or activity; with inflated self-esteem or grandiosity, decreased need for sleep, increased rate of speech, racing thoughts, increased goal directed activity or risky/disinhibited behavior.     Pt denies symptoms of anxiety including nervousness, uncontrolled worry, being easily fatigued, irritability, muscle tension, and sleep disturbance.     Pt denies depressed mood, anhedonia, decreased motivation, feelings of worthlessness and low self-esteem, sleep disturbance, appetite/wt change,and hopelessness.     Denies any " "SI/HI/AVH.     States she "loves to sleep. It's my favorite thing". Sleeps approximately 7-8 hr/night. Reports no problems with sleep initiation. Reports no problems with sleep maintenance.     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 She  Recently saw a video of adults with ADHD and reports she has all of the same sxs. She reports "my brain is always active". Has been biting her nails since she was a child. Reports bx problems and poor grades prior to the age of 3rd grade when she moved in with her grandparents. States her mother was a young mother and was unable to raise her. States she made good grades through college but had to put in a lot of effort to do so.    Pt reports that she "absolutely will not take any antidepressants".      Previous ADHD dx: denies  Started meds: n/a  Current problems at work: none - recently put in her 2 week notice due to increased work load  How often are meds taken: n/a  Last took stimulant: n/a  ASE of meds: n/a  Current BP: 116/60   Current pulse: 82    Collateral info: pt gives verbal permission for me to speak to  Valerie Salcido (pt's mother)   [phone] - attempted to call - someone picked up and hung up  Rebecca Berger (pt's GM)   [phone] - attempted to call - no answer    Fmly hx of sudden cardiac death: denies    Personal hx of --  Heart problems/arrhythmias/enlarged heart: denies  Heart palpitations: denies  Syncope/dizziness: denies  Hyperthyroidism: denies  Glaucoma: denies  BARRAGAN or SOB: denies  Chest pain: denies      Risk Parameters:  Patient reports no suicidal ideation  Patient reports no homicidal ideation  Patient reports no self-injurious behavior  Patient reports no violent behavior      Psychotropic medication review  Previous Trials-  None    Current meds-  None    Stressors:    Occupational concerns      History:     Past Psychiatric History:   Previous therapy: " "yes  Previous psychiatric hospitalizations: no  Previous diagnoses: no  Previous suicide attempts: no  Currently in treatment with an individual therapist.  History of violence: no    Other pertinent history: None  Access to a gun: denies    Family Psychiatric History:    Reports "every one in my family has something. My mom and my GM are depressed". No other known hx anxiety, depression, bipolar/brittney, psychosis/schizophrenia spectrum disorder, suicide attempts      Additional historical information includes:   Seizure: denies  Head trauma/TBI: denies    The following portions of the patient's history were reviewed and updated as appropriate: allergies, current medications, past family history, past medical history, past social history, past surgical history, and problem list.      Review Of Systems:       Review of Systems   Constitutional: Negative for chills and fever.   HENT: Negative for ear pain and hearing loss.    Eyes: Negative for double vision and pain.   Respiratory: Negative for shortness of breath and wheezing.    Cardiovascular: Negative for chest pain and palpitations.   Gastrointestinal: Negative for abdominal pain, constipation, diarrhea, nausea and vomiting.   Genitourinary: Negative for dysuria and hematuria.   Musculoskeletal: Negative for back pain and neck pain.   Skin: Negative for itching and rash.   Neurological: Negative for dizziness, tremors, seizures and headaches.   Endo/Heme/Allergies: Does not bruise/bleed easily.   Psychiatric/Behavioral:        See HPI          Current Evaluation:     Musculoskeletal  Muscle Strength/Tone:  no dyskinesia, no dystonia, no tremor, no tic   Gait & Station:  non-ataxic      Relevant Elements of Neurological Exam: normal gait      Mental Status Evaluation:  Appearance:  unremarkable, age appropriate, normal weight, casually dressed, neatly groomed   Behavior:  normal, cooperative, eye contact normal   Speech:  no latency; no press, spontaneous   Mood:  " euthymic   Affect:  congruent and appropriate   Thought Process:  normal and logical   Thought Content:  normal, no suicidality, no homicidality, delusions, or paranoia   Sensorium:  grossly intact   Cognition:  grossly intact and fund of knowledge intact and appropriate to age and level of education   Insight:  intact   Judgment:  behavior is adequate to circumstances, age appropriate     Standardized Screenings tools:   Adult ADHD Self-Report Scale: Part A:  20   Part B:  35    Functioning in Relationships:  Spouse/Partner/Family: supportive   Peers: supportive  Employers: stable    Constitutional  Vitals:  Most recent vital signs, dated less than 90 days prior to this appointment, were reviewed.    Vitals:    12/03/20 1656   BP: 116/60   Pulse: 82        General:  unremarkable, age appropriate, normal weight, casually dressed, neatly groomed     Labs: reviewed most recent labs    Laboratory Data  No visits with results within 1 Month(s) from this visit.   Latest known visit with results is:   Office Visit on 10/22/2020   Component Date Value Ref Range Status    POC Preg Test, Ur 10/22/2020 Negative  Negative Final     Acceptable 10/22/2020 Yes   Final         Medications  Outpatient Encounter Medications as of 12/3/2020   Medication Sig Dispense Refill    clotrimazole (LOTRIMIN) 1 % cream Apply to affected area 2 times daily 30 g 1    luliconazole 1 % Crea Apply 1 g topically once daily. 60 g 0    norethindrone-ethinyl estradiol (LOESTRIN 1/20, 21,) 1-20 mg-mcg per tablet Take 1 tablet by mouth once daily. 28 tablet 12    spironolactone (ALDACTONE) 100 MG tablet TK 1 T PO QD FOR ACNE  5     No facility-administered encounter medications on file as of 12/3/2020.            Assessment - Diagnosis - Goals:     Impression:       ICD-10-CM ICD-9-CM   1. Inattention  R41.840 799.51       Strengths and Liabilities: Strength: Patient accepts guidance/feedback, Strength: Patient is  expressive/articulate., Strength: Patient is intelligent., Strength: Patient is physically healthy., Strength: Patient has reasonable judgment., Strength: Patient is stable., Liability: Patient lacks coping skills.    Treatment Goals:    ADHD: Increase frequency of on-task behaviors, sustain attention and concentration consistently for longer period so time, learn techniques for managing stress, and increase the frequency of physical exercise.     Treatment Plan/Recommendations:   · Labs: order UDS   · No meds rx'd today.   · Unsuccessfully ttempted to obtain collateral info from 2 sources.  · Continue with individual therapy  · The treatment plan and follow up plan were reviewed with the patient.  · Discussed with patient informed consent, risks vs. benefits, alternative treatments, side effect profile and the inherent unpredictability of individual responses to these treatments. The patient expresses understanding of the above and displays the capacity to agree with this current plan and had no other questions.   · Encouraged the patient to keep future appointments.   · Take medications as prescribed and abstain from substance use.   · Present to ED or call 911 for SI/HI plan or intent, psychosis, or medical emergency.      Return to Clinic: 1 month    Total time: 60 minutes of total time spent on the encounter, which includes face to face time and non-face to face time preparing to see the patient (eg, review of tests), Obtaining and/or reviewing separately obtained history, Documenting clinical information in the electronic or other health record, Independently interpreting results (not separately reported) and communicating results to the patient/family/caregiver, or Care coordination (not separately reported).         RAMYA Perry, PMHNP-BC

## 2020-12-03 ENCOUNTER — OFFICE VISIT (OUTPATIENT)
Dept: PSYCHIATRY | Facility: CLINIC | Age: 25
End: 2020-12-03
Payer: OTHER GOVERNMENT

## 2020-12-03 VITALS — HEART RATE: 82 BPM | SYSTOLIC BLOOD PRESSURE: 116 MMHG | DIASTOLIC BLOOD PRESSURE: 60 MMHG

## 2020-12-03 DIAGNOSIS — R41.840 INATTENTION: ICD-10-CM

## 2020-12-03 PROCEDURE — 90792 PSYCH DIAG EVAL W/MED SRVCS: CPT | Mod: S$GLB,,, | Performed by: NURSE PRACTITIONER

## 2020-12-03 PROCEDURE — 99999 PR PBB SHADOW E&M-EST. PATIENT-LVL III: ICD-10-PCS | Mod: PBBFAC,,, | Performed by: NURSE PRACTITIONER

## 2020-12-03 PROCEDURE — 90792 PR PSYCHIATRIC DIAGNOSTIC EVALUATION W/MEDICAL SERVICES: ICD-10-PCS | Mod: S$GLB,,, | Performed by: NURSE PRACTITIONER

## 2020-12-03 PROCEDURE — 99999 PR PBB SHADOW E&M-EST. PATIENT-LVL III: CPT | Mod: PBBFAC,,, | Performed by: NURSE PRACTITIONER

## 2020-12-04 ENCOUNTER — LAB VISIT (OUTPATIENT)
Dept: LAB | Facility: OTHER | Age: 25
End: 2020-12-04
Attending: PHYSICIAN ASSISTANT
Payer: OTHER GOVERNMENT

## 2020-12-04 DIAGNOSIS — Z20.822 EXPOSURE TO COVID-19 VIRUS: ICD-10-CM

## 2020-12-04 DIAGNOSIS — Z01.83 BLOOD TYPING ENCOUNTER: ICD-10-CM

## 2020-12-04 DIAGNOSIS — Z11.3 SCREEN FOR STD (SEXUALLY TRANSMITTED DISEASE): ICD-10-CM

## 2020-12-04 DIAGNOSIS — F32.A ANXIETY AND DEPRESSION: ICD-10-CM

## 2020-12-04 DIAGNOSIS — Z13.39 ADHD (ATTENTION DEFICIT HYPERACTIVITY DISORDER) EVALUATION: Primary | ICD-10-CM

## 2020-12-04 DIAGNOSIS — F41.9 ANXIETY AND DEPRESSION: ICD-10-CM

## 2020-12-04 DIAGNOSIS — Z13.220 SCREENING FOR HYPERLIPIDEMIA: ICD-10-CM

## 2020-12-04 LAB
ABO + RH BLD: NORMAL
ALBUMIN SERPL BCP-MCNC: 4.4 G/DL (ref 3.5–5.2)
ALP SERPL-CCNC: 46 U/L (ref 55–135)
ALT SERPL W/O P-5'-P-CCNC: 15 U/L (ref 10–44)
ANION GAP SERPL CALC-SCNC: 8 MMOL/L (ref 8–16)
AST SERPL-CCNC: 16 U/L (ref 10–40)
BASOPHILS # BLD AUTO: 0.03 K/UL (ref 0–0.2)
BASOPHILS NFR BLD: 0.4 % (ref 0–1.9)
BILIRUB SERPL-MCNC: 0.7 MG/DL (ref 0.1–1)
BUN SERPL-MCNC: 10 MG/DL (ref 6–20)
CALCIUM SERPL-MCNC: 9.2 MG/DL (ref 8.7–10.5)
CHLORIDE SERPL-SCNC: 107 MMOL/L (ref 95–110)
CO2 SERPL-SCNC: 22 MMOL/L (ref 23–29)
CREAT SERPL-MCNC: 0.8 MG/DL (ref 0.5–1.4)
DIFFERENTIAL METHOD: NORMAL
EOSINOPHIL # BLD AUTO: 0 K/UL (ref 0–0.5)
EOSINOPHIL NFR BLD: 0.4 % (ref 0–8)
ERYTHROCYTE [DISTWIDTH] IN BLOOD BY AUTOMATED COUNT: 11.9 % (ref 11.5–14.5)
EST. GFR  (AFRICAN AMERICAN): >60 ML/MIN/1.73 M^2
EST. GFR  (NON AFRICAN AMERICAN): >60 ML/MIN/1.73 M^2
GLUCOSE SERPL-MCNC: 73 MG/DL (ref 70–110)
HCT VFR BLD AUTO: 42.1 % (ref 37–48.5)
HGB BLD-MCNC: 14.1 G/DL (ref 12–16)
IMM GRANULOCYTES # BLD AUTO: 0.02 K/UL (ref 0–0.04)
IMM GRANULOCYTES NFR BLD AUTO: 0.2 % (ref 0–0.5)
LYMPHOCYTES # BLD AUTO: 2.3 K/UL (ref 1–4.8)
LYMPHOCYTES NFR BLD: 27.5 % (ref 18–48)
MCH RBC QN AUTO: 29.2 PG (ref 27–31)
MCHC RBC AUTO-ENTMCNC: 33.5 G/DL (ref 32–36)
MCV RBC AUTO: 87 FL (ref 82–98)
MONOCYTES # BLD AUTO: 0.7 K/UL (ref 0.3–1)
MONOCYTES NFR BLD: 8.7 % (ref 4–15)
NEUTROPHILS # BLD AUTO: 5.3 K/UL (ref 1.8–7.7)
NEUTROPHILS NFR BLD: 62.8 % (ref 38–73)
NRBC BLD-RTO: 0 /100 WBC
PLATELET # BLD AUTO: 324 K/UL (ref 150–350)
PMV BLD AUTO: 9.3 FL (ref 9.2–12.9)
POTASSIUM SERPL-SCNC: 3.8 MMOL/L (ref 3.5–5.1)
PROT SERPL-MCNC: 7.1 G/DL (ref 6–8.4)
RBC # BLD AUTO: 4.83 M/UL (ref 4–5.4)
SODIUM SERPL-SCNC: 137 MMOL/L (ref 136–145)
WBC # BLD AUTO: 8.37 K/UL (ref 3.9–12.7)

## 2020-12-04 PROCEDURE — 86901 BLOOD TYPING SEROLOGIC RH(D): CPT

## 2020-12-04 PROCEDURE — 86696 HERPES SIMPLEX TYPE 2 TEST: CPT

## 2020-12-04 PROCEDURE — 86769 SARS-COV-2 COVID-19 ANTIBODY: CPT

## 2020-12-04 PROCEDURE — 36415 COLL VENOUS BLD VENIPUNCTURE: CPT

## 2020-12-04 PROCEDURE — 80061 LIPID PANEL: CPT

## 2020-12-04 PROCEDURE — 85025 COMPLETE CBC W/AUTO DIFF WBC: CPT

## 2020-12-04 PROCEDURE — 80053 COMPREHEN METABOLIC PANEL: CPT

## 2020-12-05 LAB
CHOLEST SERPL-MCNC: 120 MG/DL (ref 120–199)
CHOLEST/HDLC SERPL: 2.9 {RATIO} (ref 2–5)
HDLC SERPL-MCNC: 41 MG/DL (ref 40–75)
HDLC SERPL: 34.2 % (ref 20–50)
HSV1 IGG SERPL QL IA: NEGATIVE
HSV2 IGG SERPL QL IA: NEGATIVE
LDLC SERPL CALC-MCNC: 68 MG/DL (ref 63–159)
NONHDLC SERPL-MCNC: 79 MG/DL
SARS-COV-2 IGG SERPLBLD QL IA.RAPID: NEGATIVE
TRIGL SERPL-MCNC: 55 MG/DL (ref 30–150)

## 2020-12-06 NOTE — PROGRESS NOTES
Your COVID antibody test is negative. Continue to social distance and follow CDC guidelines. Your cholesterol is normal.  Normal sodium, potassium, liver and kidney function. Normal blood count, no anemia, white blood cells, and platelets normal.    Denise Perry MD

## 2020-12-07 ENCOUNTER — CLINICAL SUPPORT (OUTPATIENT)
Dept: PSYCHIATRY | Facility: CLINIC | Age: 25
End: 2020-12-07
Payer: OTHER GOVERNMENT

## 2020-12-07 VITALS
BODY MASS INDEX: 21.77 KG/M2 | HEART RATE: 88 BPM | DIASTOLIC BLOOD PRESSURE: 53 MMHG | WEIGHT: 122.94 LBS | SYSTOLIC BLOOD PRESSURE: 109 MMHG

## 2020-12-07 DIAGNOSIS — Z13.39 ADHD (ATTENTION DEFICIT HYPERACTIVITY DISORDER) EVALUATION: ICD-10-CM

## 2020-12-07 LAB
AMPHET+METHAMPHET UR QL: NEGATIVE
BARBITURATES UR QL SCN>200 NG/ML: NEGATIVE
BENZODIAZ UR QL SCN>200 NG/ML: NEGATIVE
BZE UR QL SCN: NEGATIVE
CANNABINOIDS UR QL SCN: NEGATIVE
CREAT UR-MCNC: 276 MG/DL (ref 15–325)
ETHANOL UR-MCNC: 21 MG/DL
METHADONE UR QL SCN>300 NG/ML: NEGATIVE
OPIATES UR QL SCN: NEGATIVE
PCP UR QL SCN>25 NG/ML: NEGATIVE
TOXICOLOGY INFORMATION: ABNORMAL

## 2020-12-07 PROCEDURE — 99999 PR PBB SHADOW E&M-EST. PATIENT-LVL I: ICD-10-PCS | Mod: PBBFAC,,,

## 2020-12-07 PROCEDURE — 80307 DRUG TEST PRSMV CHEM ANLYZR: CPT

## 2020-12-07 PROCEDURE — 99999 PR PBB SHADOW E&M-EST. PATIENT-LVL I: CPT | Mod: PBBFAC,,,

## 2020-12-08 ENCOUNTER — PATIENT MESSAGE (OUTPATIENT)
Dept: FAMILY MEDICINE | Facility: CLINIC | Age: 25
End: 2020-12-08

## 2020-12-08 DIAGNOSIS — L30.9 DERMATITIS: Primary | ICD-10-CM

## 2020-12-15 ENCOUNTER — OFFICE VISIT (OUTPATIENT)
Dept: PODIATRY | Facility: CLINIC | Age: 25
End: 2020-12-15
Payer: OTHER GOVERNMENT

## 2020-12-15 VITALS — BODY MASS INDEX: 21.76 KG/M2 | WEIGHT: 122.81 LBS | HEIGHT: 63 IN

## 2020-12-15 DIAGNOSIS — M20.41 HAMMER TOES OF BOTH FEET: ICD-10-CM

## 2020-12-15 DIAGNOSIS — L60.0 INGROWN TOENAIL: ICD-10-CM

## 2020-12-15 DIAGNOSIS — M20.5X1 HALLUX LIMITUS, ACQUIRED, RIGHT: ICD-10-CM

## 2020-12-15 DIAGNOSIS — M21.41 PES PLANUS OF BOTH FEET: ICD-10-CM

## 2020-12-15 DIAGNOSIS — L60.8 PINCER NAIL DEFORMITY: ICD-10-CM

## 2020-12-15 DIAGNOSIS — M20.42 HAMMER TOES OF BOTH FEET: ICD-10-CM

## 2020-12-15 DIAGNOSIS — M20.5X2 HALLUX LIMITUS, ACQUIRED, LEFT: ICD-10-CM

## 2020-12-15 DIAGNOSIS — M79.676 GREAT TOE PAIN, UNSPECIFIED LATERALITY: Primary | ICD-10-CM

## 2020-12-15 DIAGNOSIS — M21.42 PES PLANUS OF BOTH FEET: ICD-10-CM

## 2020-12-15 PROCEDURE — 99999 PR PBB SHADOW E&M-EST. PATIENT-LVL III: CPT | Mod: PBBFAC,,, | Performed by: PODIATRIST

## 2020-12-15 PROCEDURE — 99999 PR PBB SHADOW E&M-EST. PATIENT-LVL III: ICD-10-PCS | Mod: PBBFAC,,, | Performed by: PODIATRIST

## 2020-12-15 PROCEDURE — 99203 PR OFFICE/OUTPT VISIT, NEW, LEVL III, 30-44 MIN: ICD-10-PCS | Mod: S$GLB,,, | Performed by: PODIATRIST

## 2020-12-15 PROCEDURE — 99203 OFFICE O/P NEW LOW 30 MIN: CPT | Mod: S$GLB,,, | Performed by: PODIATRIST

## 2020-12-15 NOTE — LETTER
December 15, 2020      Denise Perry MD  [address]  [place] [address]           [facility]  [address] [facility]  [place] [address]  Phone: [phone]          Patient: Erin Salcido   MR Number: [id number]   YOB: 1995   Date of Visit: 12/15/2020       Dear Dr. Denise Perry:    Thank you for referring Erin Salcido to me for evaluation. Attached you will find relevant portions of my assessment and plan of care.    If you have questions, please do not hesitate to call me. I look forward to following Erin Salcido along with you.    Sincerely,    Loan Stinson DPM    Enclosure  CC:  No Recipients    If you would like to receive this communication electronically, please contact [email or web address] or [phone] to request more information on [employer] Link access.    For providers and/or their staff who would like to refer a patient to [facility], please contact us through our one-stop-shop provider referral line, [facility], at [phone].    If you feel you have received this communication in error or would no longer like to receive these types of communications, please e-mail [email or web address]

## 2020-12-15 NOTE — PATIENT INSTRUCTIONS
Supplements for inflammation: Arnica Tabs, bromelain with tumeric, alpha lipoic acid, garlic     Over the counter pain creams: Biofreeze, Bengay, tiger balm, two old goat, lidocaine gel,  Absorbine Veterinary Liniment Gel Topical Analgesic Sore Muscle and Joint Pain Relief    Recommend lotions: eucerin, eucerin for diabetics, aquaphor, A&D ointment, gold bond for diabetics, sween, German's Bees all purpose baby ointment,  urea 40 with aloe (found on amazon.com)    Shoe recommendations: (try 6pm.com, zappos.Advaliant , [email or web address].Advaliant, or shoes.Advaliant for discounted prices) you can visit [employer] shoes in [place]  or [employer] in the [facility] (there are also several shoe brand outlets in the [facility])    Asics (GT 2000 or gel foundations), new balance stability type shoes (such as the 940 series), saucony (stabil c3),  Galarza (GTS or Beast or transcend), propet (tennis shoe)    Sofft Brand, baretraps (women) Donald&Eze (men), clarks, crocs, aerosoles, naturalizers, SAS, ecco, born, florencia calixto, driss (dress shoes)    Vionic, burkenstocks, fitflops, propet, baretraps (sandals)    Nike comfort thong sandals, crocs, propet (house shoes)    Nail Home remedy:  Vicks Vapor rub or Emuaid to nails for easier manageability    Occasional soaks for 15-20 mins in luke warm water with 1/2 cup of listerine and 1 cup of apple cider vinegar are ok You may add several drops of oil of oregano or tea tree oil as well        Ingrown Toenail, Excised  An ingrown toenail occurs when the nail grows sideways into the skin alongside the nail. This can cause pain, especially when wearing tight shoes. It can also lead to an infection with redness and swelling.  The side of the nail will need to be removed in order to stop the pain and release any infection present. If there is a lot of redness and swelling, then an antibiotic may also be used. The redness and pain should begin to go away within 48 hours. It will take about two weeks for the  "exposed nail bed to become dry and all of the swelling to go down.  If only the side of the nail was removed it will begin to grow back in a few months. To prevent recurrence, that side of nail bed may be treated with a strong chemical to prevent the nail from regrowing ("ablation").  Home care  The following guidelines will help you care for your toe at home:  · Once a day beginning in 24 hours::   ¨ Clean the toe separate from your body with warm running water and antibacterial soap such as dial  ¨ Clean any remaining crust away with soap and water using a cotton-tipped applicator.  ¨ Apply betadine to the toe.  ¨ Cover with a breathable bandage or until the exposed nail bed is dry and there is no more drainage.  · Change the dressing daily, or whenever it becomes wet or dirty.  · If you were prescribed antibiotics, take them as directed until they are all gone.  · Wear comfortable shoes with a lot of toe room, or open-toe sandals, while your toe is healing.  · You may use acetaminophen or ibuprofen to control pain, unless another medicine was prescribed. If you have chronic liver or kidney disease or ever had a stomach ulcer or GI bleeding, talk with your doctor before using these medicines.  Prevention  To prevent ingrown toenails:  · Wear shoes that fit well. Avoid shoes that pinch the toes together.  · When you trim your toenails, do not cut them too short. Cut straight across at the top and do not round the edges.  · Do not use a sharp object to clean under your nail since this might cause an infection.  · At first signs of a recurrence, insert a small piece of cotton under that side of the nail to help it grow out straight.  Follow-up care  Follow up with your doctor or this facility as advised by our staff. If the ingrown toenail recurs, follow up with a podiatrist for nail bed ablation.  When to seek medical advice  Call your health care provider right away if any of the following occur:  · Increasing " redness, pain or swelling of the toe  · Red streaks in the skin leading away from the wound  · Continued pus or fluid drainage for more than 24 hours  · Fever of 100.4º F (38º C) or higher, or as directed by your health care provider  © 1068-5598 [employer]. [address] [place] [id number]. All rights reserved. This information is not intended as a substitute for professional medical care. Always follow your healthcare professional's instructions.          Wearing Proper Shoes                    You walk on your feet every day, forcing them to support the weight of your body. Repeated stress on your feet can cause damage over time. The right shoes can help protect your feet. The wrong shoes can cause more foot problems. Read the information below to help you find a shoe that fits your foot needs.     A good shoe fit will cover your foot outline.       A shoe that doesnt cover the outline is a bad fit.      Whats Your Foot Shape?  To get a good fit, you need to know the shape of your foot. Do this simple test: While standing, place your foot on a piece of paper and trace around it. Is your foot straight or curved? Do you have a foot problem, such as a bunion, that causes your foot outline to show a bulge on the side of your big toe?  Finding Your Fit  Bring your foot outline to the shore store to help you find the right shoe. Place a shoe you like on top of the outline to see if it matches the shape. The shoe should cover the outline. (If you have a bunion, the shoe may not cover the bulge on the outline. Look for soft leather shoes to stretch over the bunion.) Once youve found a pair of proper shoes, put them on. Walk around. Be sure the shoes dont rub or pinch. If the shoes feel good, youve found your fit!  The Right Shoe for You  A good shoe has features that provide comfort and support. It must also be the right size and shape for your feet. Look for a shoe made of breathable fabric  and lining, such as leather or canvas. Be sure that shoes have enough tread to prevent slipping. Go to a good shoe store for help finding the right shoe.  Good Shoe Features  An ideal shoe has the following:  · Laces for support. If tying laces is a problem for you, try shoes with Velcro fasteners or mariza.  · A front of the shoe (toe box) with ½ inch space in front of your longest toes.  · An arch shape that supports your foot.  · No more than 1½ inches of heel.  · A stiff, snug back of the shoe to keep your foot from sliding around.  · A smooth lining with no rough seams.  Shoe Shopping Tips  Below are some dos and donts for when you go to the shoe store.  Do:  · Select the shoes that feel right. Wear them around the house. Then bring them to your foot doctor to check for fit. If they dont fit well, return them.  · Shop late in the day, when your feet will be slightly bigger.  · Each time you buy shoes, have both your feet measured while you are standing. Foot size changes with time.  · Pick shoes to suit their purpose. High heels are okay for an occasional night on the town. But for everyday wear, choose a more sensible shoe.  · Try on shoes while wearing any inserts specially made for your feet (orthoses).  · Try on both the right and left shoes. If your feet are different sizes, pick a pair that fits the larger foot.  Dont:  · Dont buy shoes based on shoe size alone. Always try on shoes, as sizes differ from brand to brand and within brands.  · Dont expect shoes to break in. If they dont fit at the store, dont buy them.  · Dont buy a shoe that doesnt match your foot shape.  What About Socks?  Always wear socks with shoes. Socks help absorb sweat and reduce friction and blistering. When shopping for shoes, choose soft, padded socks with seams that dont irritate your feet.  If You Have Foot Problems  Some foot problems cause deformities. This can make it hard to find a good fit. Look for shoes made  of soft leather to stretch over the deformity. If you have bunions, buy shoes with a wider toe box. To fit hammertoes, look for shoes with a tall toe box. If you have arch problems, you may need inserts. In some cases, youll need to have custom footwear or orthoses made for your feet.  Suggested Footwear  Ask your healthcare provider what kind of footwear you need. He or she may recommend a certain brand or shoe store.  © 6389-5122 [employer]. [address] [place] [address]. All rights reserved. This information is not intended as a substitute for professional medical care. Always follow your healthcare professional's instructions.        Toe Extension (Flexibility)    These instructions are for your right foot. Switch sides for your left foot.  1. Sit in a chair. Rest your right ankle on your left knee.  2. Hold your toes with your right hand. Gently bend the toes backward. Feel a stretch in the undersides of the toes and ball of the foot. Hold for 30 to 60 seconds.  3. Then gently bend the toes in the other direction. Gently press on them until your foot is pointed. Hold for 30 to 60 seconds.  4. Repeat 5 times, or as instructed.  © 5156-3354 [employer]. [address] [place] [address], [place] [address]. All rights reserved. This information is not intended as a substitute for professional medical care. Always follow your healthcare professional's instructions.

## 2020-12-16 NOTE — PROGRESS NOTES
Patient needs his Hydralazine filled through the [facility].   Subjective:      Patient ID: Erin Salcido is a 25 y.o. female.    Chief Complaint: Ingrown Toenail      Erin Salcido is a 25 y.o. female who presents to the clinic complaining of painful ingrown toenail on both feet. Patient has attempted self treatment with nail nippers.  She relates that approximately 1 year ago she does get into the nail border so aggressively that she cost on infection.  She relates that this was a 1 time occurrence however the ingrown nails occur every month. Patient denies history of trauma. Patient  denies purulent drainage revently.    Current shoe gear:  Clogs    Patient Active Problem List   Diagnosis    Inattention       Current Outpatient Medications on File Prior to Visit   Medication Sig Dispense Refill    clotrimazole (LOTRIMIN) 1 % cream Apply to affected area 2 times daily 30 g 1    spironolactone (ALDACTONE) 100 MG tablet TK 1 T PO QD FOR ACNE  5    norethindrone-ethinyl estradiol (LOESTRIN 1/20, 21,) 1-20 mg-mcg per tablet Take 1 tablet by mouth once daily. 28 tablet 12     No current facility-administered medications on file prior to visit.        Review of patient's allergies indicates:  No Known Allergies    History reviewed. No pertinent surgical history.    Family History   Problem Relation Age of Onset    Breast cancer Maternal Grandmother     Depression Maternal Grandmother     Depression Mother     Colon cancer Neg Hx     Hypertension Neg Hx     Diabetes Neg Hx     Ovarian cancer Neg Hx        Social History     Socioeconomic History    Marital status: Single     Spouse name: Not on file    Number of children: 0    Years of education: Not on file    Highest education level: Not on file   Occupational History    Not on file   Social Needs    Financial resource strain: Not on file    Food insecurity     Worry: Not on file     Inability: Not on file    Transportation needs     Medical: Not on file     Non-medical: Not on file   Tobacco Use    Smoking  "status: Never Smoker    Smokeless tobacco: Never Used   Substance and Sexual Activity    Alcohol use: Yes     Comment: 3-4/week    Drug use: Not Currently     Types: Marijuana    Sexual activity: Yes     Partners: Male     Birth control/protection: I.U.D., Condom   Lifestyle    Physical activity     Days per week: Not on file     Minutes per session: Not on file    Stress: Not on file   Relationships    Social connections     Talks on phone: Not on file     Gets together: Not on file     Attends [employer] service: Not on file     Active member of club or organization: Not on file     Attends meetings of clubs or organizations: Not on file     Relationship status: Not on file   Other Topics Concern    Patient feels they ought to cut down on drinking/drug use Not Asked    Patient annoyed by others criticizing their drinking/drug use Not Asked    Patient has felt bad or guilty about drinking/drug use Not Asked    Patient has had a drink/used drugs as an eye opener in the AM Not Asked   Social History Narrative    Not on file       Review of Systems   Constitution: Negative for chills and fever.   Cardiovascular: Negative for claudication and leg swelling.   Respiratory: Negative for cough and shortness of breath.    Skin: Positive for dry skin and nail changes. Negative for itching and rash.   Musculoskeletal: Negative for falls, joint pain, joint swelling and muscle weakness.   Gastrointestinal: Negative for diarrhea, nausea and vomiting.   Neurological: Negative for numbness, paresthesias, tremors and weakness.   Psychiatric/Behavioral: Negative for altered mental status and hallucinations.           Objective:      Vitals:    12/15/20 0913   Weight: 55.7 kg (122 lb 12.7 oz)   Height: 5' 3" (1.6 m)   PainSc: 0-No pain       Physical Exam  Nursing note reviewed.   Constitutional:       General: She is not in acute distress.     Appearance: She is not toxic-appearing or diaphoretic.   Cardiovascular:      " Pulses:           Dorsalis pedis pulses are 2+ on the right side and 2+ on the left side.        Posterior tibial pulses are 2+ on the right side and 2+ on the left side.      Comments: dorsalis pedis and posterior tibial pulses are palpable bilaterally. Capillary refill time is within normal limits.  Pulmonary:      Effort: No respiratory distress.   Musculoskeletal:      Right ankle: She exhibits normal range of motion and no swelling. No tenderness. No lateral malleolus, no medial malleolus, no AITFL, no CF ligament and no posterior TFL tenderness found. Achilles tendon exhibits no pain, no defect and normal Reeder's test results.      Left ankle: She exhibits normal range of motion and no swelling. No tenderness. No lateral malleolus, no medial malleolus, no AITFL, no CF ligament and no posterior TFL tenderness found. Achilles tendon exhibits no pain, no defect and normal Reeder's test results.      Right foot: No tenderness or bony tenderness.      Left foot: No tenderness or bony tenderness.      Comments: There is equinus deformity bilateral with decreased dorsiflexion at the ankle joint bilateral. No tenderness with compression of heel. Negative tinels sign. Gait analysis reveals excessive pronation through midstance and propulsion with early heel off. Shoes reveals lateral heel counter wear bilateral \    Decreased arch height noted b/l. Negative too many toes sign.     Decreased first MPJ range of motion both weightbearing and nonweightbearing, no crepitus observed the first MP joint, + dorsal flag sign. Mild  bunion deformity is observed .    Patient has hammertoes of digits 2-5 bilateral partially reducible      Skin:     General: Skin is warm and dry.      Coloration: Skin is not pale.      Findings: No bruising, burn, laceration, lesion or rash.      Nails: There is no clubbing.        Comments: Tenderness to bilateral hallux nail margin of each foot with ingrown nail plate and HPK buildup.  No  surrounding erythema nor edema is noted there is no granuloma formation noted. no malodor     All nail borders incurvated     Neurological:      Sensory: No sensory deficit.      Motor: No tremor, atrophy or abnormal muscle tone.      Deep Tendon Reflexes: Reflexes are normal and symmetric.      Comments: Weir-Nancy 5.07 monofilament is intact bilateral feet. Sharp/dull sensation is also intact Bilateral feet.     Psychiatric:         Attention and Perception: She is attentive.         Mood and Affect: Mood is not anxious. Affect is not inappropriate.         Speech: She is communicative. Speech is not slurred.         Behavior: Behavior is not combative.               Assessment:       Encounter Diagnoses   Name Primary?    Ingrown toenail     Great toe pain, unspecified laterality Yes    Pes planus of both feet     Hammer toes of both feet     Hallux limitus, acquired, right     Hallux limitus, acquired, left     Pincer nail deformity          Plan:       Erin was seen today for ingrown toenail.    Diagnoses and all orders for this visit:    Great toe pain, unspecified laterality    Ingrown toenail  -     Ambulatory referral/consult to Podiatry    Pes planus of both feet    Hammer toes of both feet    Hallux limitus, acquired, right    Hallux limitus, acquired, left    Pincer nail deformity      I counseled the patient on her conditions, their implications and medical management.    In depth conversation on the treatment of ingrown nail; partial nail avulsion vs chemical matrixectomy vs conservative treatment of soaking and nail trimming    Informed patient that many nail problems can be prevented by wearing the right shoes and trimming your nails properly.   The right shoes: Feet were measured.  Patient is to wear shoes that are supportive and roomy enough for toes to wiggle. Look for shoes made of natural materials such as leather, which allow  feet to breathe.   Proper trimming: To avoid  problems, she was instructed to trim toenails straight across without cutting down into the corners.     Stretching handout dispensed to patient. Instructions on adequate stretching reviewed in clinic      RTC PRN or if patient wishes to pursue ingrown nail procedure        Procedures

## 2020-12-29 ENCOUNTER — PATIENT MESSAGE (OUTPATIENT)
Dept: OBSTETRICS AND GYNECOLOGY | Facility: CLINIC | Age: 25
End: 2020-12-29

## 2021-01-27 ENCOUNTER — OFFICE VISIT (OUTPATIENT)
Dept: PSYCHIATRY | Facility: CLINIC | Age: 26
End: 2021-01-27
Payer: OTHER GOVERNMENT

## 2021-01-27 DIAGNOSIS — R41.840 INATTENTION: Primary | ICD-10-CM

## 2021-01-27 PROCEDURE — 99213 PR OFFICE/OUTPT VISIT, EST, LEVL III, 20-29 MIN: ICD-10-PCS | Mod: 95,,, | Performed by: NURSE PRACTITIONER

## 2021-01-27 PROCEDURE — 99213 OFFICE O/P EST LOW 20 MIN: CPT | Mod: 95,,, | Performed by: NURSE PRACTITIONER

## 2021-04-05 ENCOUNTER — PATIENT MESSAGE (OUTPATIENT)
Dept: OBSTETRICS AND GYNECOLOGY | Facility: CLINIC | Age: 26
End: 2021-04-05

## 2021-04-06 DIAGNOSIS — Z30.09 BIRTH CONTROL COUNSELING: Primary | ICD-10-CM

## 2021-04-06 RX ORDER — NORETHINDRONE ACETATE AND ETHINYL ESTRADIOL, ETHINYL ESTRADIOL AND FERROUS FUMARATE 1MG-10(24)
1 KIT ORAL DAILY
Qty: 90 TABLET | Refills: 3 | Status: SHIPPED | OUTPATIENT
Start: 2021-04-06 | End: 2022-04-06

## 2022-05-31 ENCOUNTER — PATIENT MESSAGE (OUTPATIENT)
Dept: ADMINISTRATIVE | Facility: HOSPITAL | Age: 27
End: 2022-05-31
Payer: OTHER GOVERNMENT

## 2022-10-04 ENCOUNTER — TELEPHONE (OUTPATIENT)
Dept: OBSTETRICS AND GYNECOLOGY | Facility: CLINIC | Age: 27
End: 2022-10-04
Payer: OTHER GOVERNMENT

## 2023-05-25 ENCOUNTER — PATIENT OUTREACH (OUTPATIENT)
Dept: ADMINISTRATIVE | Facility: HOSPITAL | Age: 28
End: 2023-05-25
Payer: OTHER GOVERNMENT

## 2023-06-26 ENCOUNTER — PATIENT MESSAGE (OUTPATIENT)
Dept: ADMINISTRATIVE | Facility: HOSPITAL | Age: 28
End: 2023-06-26
Payer: OTHER GOVERNMENT

## 2023-06-26 ENCOUNTER — PATIENT OUTREACH (OUTPATIENT)
Dept: ADMINISTRATIVE | Facility: HOSPITAL | Age: 28
End: 2023-06-26
Payer: OTHER GOVERNMENT

## 2023-12-06 NOTE — TELEPHONE ENCOUNTER
----- Message from Aleyda Samson NP sent at 11/12/2019  7:57 AM CST -----  Please call patient and notify that her Affirm testing was negative for yeast, bacterial vaginosis, and trichomonas.    Thanks,  Aleyda   no